# Patient Record
Sex: MALE | Race: BLACK OR AFRICAN AMERICAN | Employment: PART TIME | URBAN - METROPOLITAN AREA
[De-identification: names, ages, dates, MRNs, and addresses within clinical notes are randomized per-mention and may not be internally consistent; named-entity substitution may affect disease eponyms.]

---

## 2017-02-23 ENCOUNTER — HOSPITAL ENCOUNTER (OUTPATIENT)
Dept: LAB | Age: 57
Discharge: HOME | End: 2017-02-23

## 2017-02-23 PROCEDURE — 99001 SPECIMEN HANDLING PT-LAB: CPT

## 2017-03-30 ENCOUNTER — OFFICE VISIT (OUTPATIENT)
Dept: UROLOGY | Age: 57
End: 2017-03-30

## 2017-03-30 VITALS
WEIGHT: 247 LBS | OXYGEN SATURATION: 95 % | HEIGHT: 65 IN | SYSTOLIC BLOOD PRESSURE: 113 MMHG | HEART RATE: 62 BPM | DIASTOLIC BLOOD PRESSURE: 70 MMHG | BODY MASS INDEX: 41.15 KG/M2

## 2017-03-30 DIAGNOSIS — R97.20 PSA ELEVATION: Primary | ICD-10-CM

## 2017-03-30 LAB
BILIRUB UR QL STRIP: NEGATIVE
GLUCOSE UR-MCNC: NEGATIVE MG/DL
KETONES P FAST UR STRIP-MCNC: NEGATIVE MG/DL
PH UR STRIP: 5.5 [PH] (ref 4.6–8)
PROT UR QL STRIP: NEGATIVE MG/DL
SP GR UR STRIP: 1.02 (ref 1–1.03)
UA UROBILINOGEN AMB POC: NORMAL (ref 0.2–1)
URINALYSIS CLARITY POC: CLEAR
URINALYSIS COLOR POC: YELLOW
URINE BLOOD POC: NEGATIVE
URINE LEUKOCYTES POC: NEGATIVE
URINE NITRITES POC: NEGATIVE

## 2017-03-30 RX ORDER — ZOLPIDEM TARTRATE 5 MG/1
TABLET ORAL
COMMUNITY

## 2017-03-30 RX ORDER — SIMVASTATIN 10 MG/1
TABLET, FILM COATED ORAL
COMMUNITY

## 2017-03-30 RX ORDER — METFORMIN HYDROCHLORIDE 500 MG/1
TABLET ORAL 2 TIMES DAILY WITH MEALS
COMMUNITY

## 2017-03-30 RX ORDER — LISINOPRIL 5 MG/1
TABLET ORAL DAILY
COMMUNITY

## 2017-03-30 RX ORDER — ALLOPURINOL 100 MG/1
TABLET ORAL DAILY
COMMUNITY

## 2017-03-30 RX ORDER — INDOMETHACIN 50 MG/1
CAPSULE ORAL 3 TIMES DAILY
COMMUNITY

## 2017-03-30 NOTE — PROGRESS NOTES
Mr. Alpesh Aldana has a reminder for a \"due or due soon\" health maintenance. I have asked that he contact his primary care provider for follow-up on this health maintenance.

## 2017-03-30 NOTE — MR AVS SNAPSHOT
Visit Information Date & Time Provider Department Dept. Phone Encounter #  
 3/30/2017  9:30 AM Mariana Matute, [address] [place] [address] [facility] [phone] [id number] Follow-up Instructions Return in about 3 weeks (around 4/20/2017). Follow-up and Disposition History Upcoming Health Maintenance Date Due Hepatitis C Screening 1960 DTaP/Tdap/Td series (1 - Tdap) 11/10/1980 FOBT Q 1 YEAR AGE 50-75 11/10/2009 INFLUENZA AGE 9 TO ADULT 8/1/2016 Allergies as of 3/30/2017  Review Complete On: 3/30/2017 By: Mariana Matute MD  
 No Known Allergies Current Immunizations  Never Reviewed No immunizations on file. Not reviewed this visit You Were Diagnosed With   
  
 Codes Comments PSA elevation    -  Primary ICD-10-CM: R97.20 ICD-9-CM: 790.93 Vitals BP Pulse Height(growth percentile) Weight(growth percentile) SpO2 BMI  
 113/70 (BP 1 Location: Left arm, BP Patient Position: Sitting) 62 5' 5\" (1.651 m) 247 lb (112 kg) 95% 41.1 kg/m2 Smoking Status Never Smoker BMI and BSA Data Body Mass Index Body Surface Area  
 41.1 kg/m 2 2.27 m 2 Preferred Pharmacy Pharmacy Name Phone [id number] [phone] [address] [phone] Your Updated Medication List  
  
   
This list is accurate as of: 3/30/17 10:07 AM.  Always use your most recent med list.  
  
  
  
  
 allopurinol 100 mg tablet Commonly known as:  Ollen Epley Take  by mouth daily. indomethacin 50 mg capsule Commonly known as:  INDOCIN Take  by mouth three (3) times daily. lisinopril 5 mg tablet Commonly known as:  Asad Shutters Take  by mouth daily. metFORMIN 500 mg tablet Commonly known as:  GLUCOPHAGE Take  by mouth two (2) times daily (with meals). simvastatin 10 mg tablet Commonly known as:  ZOCOR Take  by mouth nightly. zolpidem 5 mg tablet Commonly known as:  AMBIEN Take  by mouth nightly as needed for Sleep. We Performed the Following AMB POC URINALYSIS DIP STICK AUTO W/O MICRO [33269 CPT(R)] WA COLLECTION VENOUS BLOOD,VENIPUNCTURE [id number] CPT(R)] PSA, TOTAL &  FREE [86186 CPT(R)] Follow-up Instructions Return in about 3 weeks (around 4/20/2017). Patient Instructions Prostate-Specific Antigen (PSA) Test: About This Test 
What is it? A prostate-specific antigen (PSA) test measures the amount of PSA in your blood. PSA is released by a man's prostate gland into his blood. A high PSA level may mean that you have an enlargement, infection, or cancer of the prostate. Why is this test done? You may have this test to: · Check for prostate cancer. · Watch prostate cancer and see if treatment is working. How can you prepare for the test? 
Do not ejaculate during the 2 days before your PSA blood test, either during sex or masturbation. What happens before the test? 
Tell your doctor if you have had a: 
· Test to look at your bladder (cystoscopy) in the past several weeks. · Prostate biopsy in the past several weeks. · Prostate infection or urinary tract infection that has not gone away. · Tube (catheter) inserted into your bladder to drain urine recently. What happens during the test? 
A health professional takes a sample of your blood. What happens after the test? 
You can go back to your usual activities right away. When should you call for help? Watch closely for changes in your health, and be sure to contact your doctor if you have any questions about this test. 
Follow-up care is a key part of your treatment and safety. Be sure to make and go to all appointments, and call your doctor if you are having problems. It's also a good idea to keep a list of the medicines you take. Ask your doctor when you can expect to have your test results. Where can you learn more? Go to http://jim-aryan.info/. Enter Q512 in the search box to learn more about \"Prostate-Specific Antigen (PSA) Test: About This Test.\" Current as of: July 26, 2016 Content Version: 11.2 © 6608-4566 Visitar. Care instructions adapted under license by Zumigo (which disclaims liability or warranty for this information). If you have questions about a medical condition or this instruction, always ask your healthcare professional. [address] any warranty or liability for your use of this information. Patient Instructions History Introducing [facility] [place] [facility]! Dear Zohreh Harris: Thank you for requesting a Field Agent account. Our records indicate that you have previously registered for a Field Agent account but its currently inactive. Please call our Field Agent support line at [phone]. Additional Information If you have questions, please visit the Frequently Asked Questions section of the Field Agent website at [email or web address] Remember, Field Agent is NOT to be used for urgent needs. For medical emergencies, dial 911. Now available from your iPhone and Android! Please provide this summary of care documentation to your next provider. Your primary care clinician is listed as Noah Grey. If you have any questions after today's visit, please call [phone].

## 2017-03-30 NOTE — PATIENT INSTRUCTIONS
Prostate-Specific Antigen (PSA) Test: About This Test  What is it? A prostate-specific antigen (PSA) test measures the amount of PSA in your blood. PSA is released by a man's prostate gland into his blood. A high PSA level may mean that you have an enlargement, infection, or cancer of the prostate. Why is this test done? You may have this test to:  · Check for prostate cancer. · Watch prostate cancer and see if treatment is working. How can you prepare for the test?  Do not ejaculate during the 2 days before your PSA blood test, either during sex or masturbation. What happens before the test?  Tell your doctor if you have had a:  · Test to look at your bladder (cystoscopy) in the past several weeks. · Prostate biopsy in the past several weeks. · Prostate infection or urinary tract infection that has not gone away. · Tube (catheter) inserted into your bladder to drain urine recently. What happens during the test?  A health professional takes a sample of your blood. What happens after the test?  You can go back to your usual activities right away. When should you call for help? Watch closely for changes in your health, and be sure to contact your doctor if you have any questions about this test.  Follow-up care is a key part of your treatment and safety. Be sure to make and go to all appointments, and call your doctor if you are having problems. It's also a good idea to keep a list of the medicines you take. Ask your doctor when you can expect to have your test results. Where can you learn more? Go to http://jim-aryan.info/. Enter L095 in the search box to learn more about \"Prostate-Specific Antigen (PSA) Test: About This Test.\"  Current as of: July 26, 2016  Content Version: 11.2  © 0482-5786 SocialShield. Care instructions adapted under license by Alt12 Apps (which disclaims liability or warranty for this information).  If you have questions about a medical condition or this instruction, always ask your healthcare professional. [address] [address] any warranty or liability for your use of this information.

## 2017-03-30 NOTE — PROGRESS NOTES
Chief Complaint   Patient presents with    New Patient    Elevated PSA    Polyuria       HISTORY OF PRESENT ILLNESS:  Mauricio Velez is a 61 y.o. male who presents today for evaluation of a PSA of 3.3. Traditionally is felt that a 69-year-old man ought to have a PSA of less than 2.5 and hence he is sent here for evaluation of this elevation. He denies any history of significant urinary outlet obstructive symptoms. He does have some daytime frequency but he only has nocturia ×1. No urinary infections, no gross hematuria. He denies knowledge of any family members with prostate cancer. And as well as I can tell from the chart, he has not had a prior PSA. ROS this is all documented on the chart refer to that for details    Past Medical History:   Diagnosis Date    Diabetes [facility])        History reviewed. No pertinent surgical history. Social History   Substance Use Topics    Smoking status: Never Smoker    Smokeless tobacco: None    Alcohol use Yes       No Known Allergies    Family History   Problem Relation Age of Onset    Diabetes Sister        Current Outpatient Prescriptions   Medication Sig Dispense Refill    metFORMIN (GLUCOPHAGE) 500 mg tablet Take  by mouth two (2) times daily (with meals).  simvastatin (ZOCOR) 10 mg tablet Take  by mouth nightly.  allopurinol (ZYLOPRIM) 100 mg tablet Take  by mouth daily.  lisinopril (PRINIVIL, ZESTRIL) 5 mg tablet Take  by mouth daily.  zolpidem (AMBIEN) 5 mg tablet Take  by mouth nightly as needed for Sleep.  indomethacin (INDOCIN) 50 mg capsule Take  by mouth three (3) times daily. PHYSICAL EXAMINATION:   Visit Vitals    /70 (BP 1 Location: Left arm, BP Patient Position: Sitting)    Pulse 62    Ht 5' 5\" (1.651 m)    Wt 247 lb (112 kg)    SpO2 95%    BMI 41.1 kg/m2     Constitutional: WDWN, Pleasant and appropriate affect, No acute distress.     CV:  No peripheral swelling noted  Respiratory: No respiratory distress or difficulties  Abdomen:  No abdominal masses or tenderness. No CVA tenderness. No inguinal hernias noted.  Male:    ALESIA:Perineum normal to visual inspection, no erythema or irritation, normal sphincter tone, the prostate is about 20-30 g in size and I do not feel any nodularity or induration generally feels benign in nature. SCROTUM:  No scrotal rash or lesions noticed. Normal bilateral testes and epididymis. PENIS: Urethral meatus normal in location and size. No urethral discharge. Skin: No jaundice. Neuro/Psych:  Alert and oriented x 3, affect appropriate. Lymphatic:   No enlarged inguinal lymph nodes. Results for orders placed or performed in visit on 03/30/17   AMB POC URINALYSIS DIP STICK AUTO W/O MICRO   Result Value Ref Range    Color (UA POC) Yellow     Clarity (UA POC) Clear     Glucose (UA POC) Negative Negative    Bilirubin (UA POC) Negative Negative    Ketones (UA POC) Negative Negative    Specific gravity (UA POC) 1.020 1.001 - 1.035    Blood (UA POC) Negative Negative    pH (UA POC) 5.5 4.6 - 8.0    Protein (UA POC) Negative Negative mg/dL    Urobilinogen (UA POC) 1 mg/dL 0.2 - 1    Nitrites (UA POC) Negative Negative    Leukocyte esterase (UA POC) Negative Negative         REVIEW OF LABS AND IMAGING: There are no images available for review. Imaging Report Reviewed? NO     Images Reviewed? NO           Other Lab Data Reviewed? YES         ASSESSMENT:     ICD-10-CM ICD-9-CM    1. PSA elevation R97.20 790.93 AMB POC URINALYSIS DIP STICK AUTO W/O MICRO      PSA, TOTAL &  FREE      ND COLLECTION VENOUS BLOOD,VENIPUNCTURE            PLAN / DISCUSSION: : I have gone over with him and his friend the relationship of PSAs and prostate cancer.   In view of the fact that this is elevated for a mid 60-year-old man, I am going to go ahead and repeat the PSA getting free and total percentages and then I will see him back in about 3 weeks and will decide whether to proceed with a biopsy at that time or not. The patient expresses understanding and agreement of the discussion and plan. Marli Viera MD on 3/30/2017         Please note: This document has been produced using voice recognition software. Unrecognized errors in transcription may be present.

## 2017-03-31 LAB
PSA FREE MFR SERPL: 11.8 %
PSA FREE SERPL-MCNC: 0.26 NG/ML
PSA SERPL-MCNC: 2.2 NG/ML (ref 0–4)

## 2017-03-31 NOTE — PROGRESS NOTES
His PSA on repeat had dropped back down to 2.2 but his percent free PSA is 11.8% and puts him in the 24% likelihood of having prostate cancer. He is scheduled to see me again on April 20 and I will repeat another PSA and free PSA. If this still shows a likelihood of prostate cancer, he will need a biopsy.

## 2017-04-20 ENCOUNTER — OFFICE VISIT (OUTPATIENT)
Dept: UROLOGY | Age: 57
End: 2017-04-20

## 2017-04-20 VITALS
HEIGHT: 65 IN | HEART RATE: 63 BPM | DIASTOLIC BLOOD PRESSURE: 64 MMHG | BODY MASS INDEX: 40.82 KG/M2 | OXYGEN SATURATION: 97 % | WEIGHT: 245 LBS | SYSTOLIC BLOOD PRESSURE: 106 MMHG

## 2017-04-20 DIAGNOSIS — R97.20 ELEVATED PSA: Primary | ICD-10-CM

## 2017-04-20 LAB
BILIRUB UR QL STRIP: NEGATIVE
GLUCOSE UR-MCNC: NEGATIVE MG/DL
KETONES P FAST UR STRIP-MCNC: NEGATIVE MG/DL
PH UR STRIP: 6 [PH] (ref 4.6–8)
PROT UR QL STRIP: NEGATIVE MG/DL
SP GR UR STRIP: 1.02 (ref 1–1.03)
UA UROBILINOGEN AMB POC: NORMAL (ref 0.2–1)
URINALYSIS CLARITY POC: CLEAR
URINALYSIS COLOR POC: YELLOW
URINE BLOOD POC: NEGATIVE
URINE LEUKOCYTES POC: NEGATIVE
URINE NITRITES POC: NEGATIVE

## 2017-04-20 NOTE — PATIENT INSTRUCTIONS
Prostate-Specific Antigen (PSA) Test: About This Test  What is it? A prostate-specific antigen (PSA) test measures the amount of PSA in your blood. PSA is released by a man's prostate gland into his blood. A high PSA level may mean that you have an enlargement, infection, or cancer of the prostate. Why is this test done? You may have this test to:  · Check for prostate cancer. · Watch prostate cancer and see if treatment is working. How can you prepare for the test?  Do not ejaculate during the 2 days before your PSA blood test, either during sex or masturbation. What happens before the test?  Tell your doctor if you have had a:  · Test to look at your bladder (cystoscopy) in the past several weeks. · Prostate biopsy in the past several weeks. · Prostate infection or urinary tract infection that has not gone away. · Tube (catheter) inserted into your bladder to drain urine recently. What happens during the test?  A health professional takes a sample of your blood. What happens after the test?  You can go back to your usual activities right away. When should you call for help? Watch closely for changes in your health, and be sure to contact your doctor if you have any questions about this test.  Follow-up care is a key part of your treatment and safety. Be sure to make and go to all appointments, and call your doctor if you are having problems. It's also a good idea to keep a list of the medicines you take. Ask your doctor when you can expect to have your test results. Where can you learn more? Go to http://jim-aryan.info/. Enter C327 in the search box to learn more about \"Prostate-Specific Antigen (PSA) Test: About This Test.\"  Current as of: July 26, 2016  Content Version: 11.2  © 9592-1224 MailMag. Care instructions adapted under license by Lever (which disclaims liability or warranty for this information).  If you have questions about a medical condition or this instruction, always ask your healthcare professional. [address] [address] any warranty or liability for your use of this information.

## 2017-04-20 NOTE — PROGRESS NOTES
Chief Complaint   Patient presents with    Elevated PSA       HISTORY OF PRESENT ILLNESS:  Twin Lange is a 61 y.o. male who presents today in follow-up to fluctuations of his PSA. In summary, Dante Galaviz was found to have an elevated PSA for a 48-year-old -American man at 3.3 earlier this year. A repeat PSA in our office resulted in a value of 2.2 but I got a total and free component at that time demonstrating an elevated likelihood of prostate cancer. Unfortunately the values of free and total PSA currently are primarily geared toward PSA determinations of above 4. At any rate his examination was normal his prostate feels benign and non-suggestive of cancer and there is no family history of prostate cancer. His PSA is repeated today and if this study from today is still below 3, I probably will simply repeated in 6 months. ROS unchanged since last office visit. Past Medical History:   Diagnosis Date    Diabetes [facility])        History reviewed. No pertinent surgical history. Social History   Substance Use Topics    Smoking status: Never Smoker    Smokeless tobacco: None    Alcohol use Yes       No Known Allergies    Family History   Problem Relation Age of Onset    Diabetes Sister        Current Outpatient Prescriptions   Medication Sig Dispense Refill    metFORMIN (GLUCOPHAGE) 500 mg tablet Take  by mouth two (2) times daily (with meals).  simvastatin (ZOCOR) 10 mg tablet Take  by mouth nightly.  allopurinol (ZYLOPRIM) 100 mg tablet Take  by mouth daily.  lisinopril (PRINIVIL, ZESTRIL) 5 mg tablet Take  by mouth daily.  zolpidem (AMBIEN) 5 mg tablet Take  by mouth nightly as needed for Sleep.  indomethacin (INDOCIN) 50 mg capsule Take  by mouth three (3) times daily.            PHYSICAL EXAMINATION:   Visit Vitals    /64 (BP 1 Location: Left arm, BP Patient Position: Sitting)    Pulse 63    Ht 5' 5\" (1.651 m)    Wt 245 lb (111.1 kg)    SpO2 97%  BMI 40.77 kg/m2     Constitutional: WDWN, Pleasant and appropriate affect, No acute distress. CV:  No peripheral swelling noted  Respiratory: No respiratory distress or difficulties  Abdomen:  No abdominal masses or tenderness. No CVA tenderness. No inguinal hernias noted.  Male:   deferred today. His prior prostate exam revealed a 40 g gland that was smooth and symmetrical with no nodularity. Skin: No jaundice. Neuro/Psych:  Alert and oriented x 3, affect appropriate. Lymphatic:   No enlarged inguinal lymph nodes. Results for orders placed or performed in visit on 04/20/17   AMB POC URINALYSIS DIP STICK AUTO W/O MICRO   Result Value Ref Range    Color (UA POC) Yellow     Clarity (UA POC) Clear     Glucose (UA POC) Negative Negative    Bilirubin (UA POC) Negative Negative    Ketones (UA POC) Negative Negative    Specific gravity (UA POC) 1.020 1.001 - 1.035    Blood (UA POC) Negative Negative    pH (UA POC) 6.0 4.6 - 8.0    Protein (UA POC) Negative Negative mg/dL    Urobilinogen (UA POC) 1 mg/dL 0.2 - 1    Nitrites (UA POC) Negative Negative    Leukocyte esterase (UA POC) Negative Negative         REVIEW OF LABS AND IMAGING:     Imaging Report Reviewed? NO     Images Reviewed? NO           Other Lab Data Reviewed? YES         ASSESSMENT:     ICD-10-CM ICD-9-CM    1. Elevated PSA R97.20 790.93 AMB POC URINALYSIS DIP STICK AUTO W/O MICRO      CT COLLECTION VENOUS BLOOD,VENIPUNCTURE      PSA, TOTAL &  FREE      CANCELED: PROSTATE SPECIFIC AG (PSA)            PLAN / DISCUSSION: : He has a borderline elevated PSA for a 34-year-old man but at this point his prostate feels normal and I am a little hesitant to proceed with a prostate biopsy. I am going to repeat that study today and will call him back but if  the PSA is still 2, I will probably just recheck him in 6 months. The patient expresses understanding and agreement of the discussion and plan.     Félix Rucker MD on 4/20/2017         Please note: This document has been produced using voice recognition software. Unrecognized errors in transcription may be present.

## 2017-04-20 NOTE — MR AVS SNAPSHOT
Visit Information Date & Time Provider Department Dept. Phone Encounter #  
 4/20/2017 10:00 AM Ryann Kim, [address] [place] [address] [facility] [phone] [id number] Upcoming Health Maintenance Date Due Hepatitis C Screening 1960 DTaP/Tdap/Td series (1 - Tdap) 11/10/1980 FOBT Q 1 YEAR AGE 50-75 11/10/2009 INFLUENZA AGE 9 TO ADULT 8/1/2016 Allergies as of 4/20/2017  Review Complete On: 4/20/2017 By: Negro Rodriguez LPN No Known Allergies Current Immunizations  Never Reviewed No immunizations on file. Not reviewed this visit You Were Diagnosed With   
  
 Codes Comments Elevated PSA    -  Primary ICD-10-CM: R97.20 ICD-9-CM: 790.93 Vitals BP Pulse Height(growth percentile) Weight(growth percentile) SpO2 BMI  
 106/64 (BP 1 Location: Left arm, BP Patient Position: Sitting) 63 5' 5\" (1.651 m) 245 lb (111.1 kg) 97% 40.77 kg/m2 Smoking Status Never Smoker Vitals History BMI and BSA Data Body Mass Index Body Surface Area 40.77 kg/m 2 2.26 m 2 Preferred Pharmacy Pharmacy Name Phone [id number] [phone] Your Updated Medication List  
  
   
This list is accurate as of: 4/20/17 10:35 AM.  Always use your most recent med list.  
  
  
  
  
 allopurinol 100 mg tablet Commonly known as:  Jarome Salvador Take  by mouth daily. indomethacin 50 mg capsule Commonly known as:  INDOCIN Take  by mouth three (3) times daily. lisinopril 5 mg tablet Commonly known as:  Abigail Mike Take  by mouth daily. metFORMIN 500 mg tablet Commonly known as:  GLUCOPHAGE Take  by mouth two (2) times daily (with meals). simvastatin 10 mg tablet Commonly known as:  ZOCOR Take  by mouth nightly. zolpidem 5 mg tablet Commonly known as:  AMBIEN Take  by mouth nightly as needed for Sleep. We Performed the Following AMB POC URINALYSIS DIP STICK AUTO W/O MICRO [56543 CPT(R)] [place] COLLECTION VENOUS BLOOD,VENIPUNCTURE [id number] CPT(R)] PSA, TOTAL &  FREE [10829 CPT(R)] Patient Instructions Prostate-Specific Antigen (PSA) Test: About This Test 
What is it? A prostate-specific antigen (PSA) test measures the amount of PSA in your blood. PSA is released by a man's prostate gland into his blood. A high PSA level may mean that you have an enlargement, infection, or cancer of the prostate. Why is this test done? You may have this test to: · Check for prostate cancer. · Watch prostate cancer and see if treatment is working. How can you prepare for the test? 
Do not ejaculate during the 2 days before your PSA blood test, either during sex or masturbation. What happens before the test? 
Tell your doctor if you have had a: 
· Test to look at your bladder (cystoscopy) in the past several weeks. · Prostate biopsy in the past several weeks. · Prostate infection or urinary tract infection that has not gone away. · Tube (catheter) inserted into your bladder to drain urine recently. What happens during the test? 
A health professional takes a sample of your blood. What happens after the test? 
You can go back to your usual activities right away. When should you call for help? Watch closely for changes in your health, and be sure to contact your doctor if you have any questions about this test. 
Follow-up care is a key part of your treatment and safety. Be sure to make and go to all appointments, and call your doctor if you are having problems. It's also a good idea to keep a list of the medicines you take. Ask your doctor when you can expect to have your test results. Where can you learn more? Go to http://jim-aryan.info/.  
Enter V481 in the search box to learn more about \"Prostate-Specific Antigen (PSA) Test: About This Test.\" 
 Current as of: July 26, 2016 Content Version: 11.2 © 4806-8808 Mobypark, Nextreme Thermal Solutions. Care instructions adapted under license by [employer]M [employer] (which disclaims liability or warranty for this information). If you have questions about a medical condition or this instruction, always ask your healthcare professional. [address] any warranty or liability for your use of this information. Introducing [facility] [place] [facility]! Dear Dominguez Garvin: Thank you for requesting a Innovectra account. Our records indicate that you have previously registered for a Innovectra account but its currently inactive. Please call our Innovectra support line at [phone]. Additional Information If you have questions, please visit the Frequently Asked Questions section of the Innovectra website at [email or web address] Remember, Innovectra is NOT to be used for urgent needs. For medical emergencies, dial 911. Now available from your iPhone and Android! Please provide this summary of care documentation to your next provider. Your primary care clinician is listed as Sharonda Gomez. If you have any questions after today's visit, please call [phone].

## 2017-04-20 NOTE — PROGRESS NOTES
RBV. Per Dr. Justino Coello lab drawn in office today for PSA free and total for elevated PSA. Mr. Bárbara Fernandes has a reminder for a \"due or due soon\" health maintenance. I have asked that he contact his primary care provider for follow-up on this health maintenance.

## 2017-04-21 LAB
PSA FREE MFR SERPL: 11.3 %
PSA FREE SERPL-MCNC: 0.27 NG/ML
PSA SERPL-MCNC: 2.4 NG/ML (ref 0–4)

## 2017-04-21 NOTE — PROGRESS NOTES
His PSA is still low at 2.4. At this time I would like to leave him alone and just have him return in 6 months for follow-up PSA.

## 2019-09-07 ENCOUNTER — APPOINTMENT (OUTPATIENT)
Dept: CT IMAGING | Age: 59
End: 2019-09-07
Attending: EMERGENCY MEDICINE
Payer: MEDICARE

## 2019-09-07 ENCOUNTER — HOSPITAL ENCOUNTER (EMERGENCY)
Age: 59
Discharge: HOME | End: 2019-09-07
Attending: EMERGENCY MEDICINE
Payer: MEDICARE

## 2019-09-07 VITALS
SYSTOLIC BLOOD PRESSURE: 110 MMHG | BODY MASS INDEX: 40.32 KG/M2 | WEIGHT: 242 LBS | OXYGEN SATURATION: 100 % | RESPIRATION RATE: 16 BRPM | TEMPERATURE: 98.6 F | DIASTOLIC BLOOD PRESSURE: 66 MMHG | HEIGHT: 65 IN

## 2019-09-07 DIAGNOSIS — R51.9 ACUTE NONINTRACTABLE HEADACHE, UNSPECIFIED HEADACHE TYPE: Primary | ICD-10-CM

## 2019-09-07 LAB
ANION GAP SERPL CALC-SCNC: 8 MMOL/L (ref 3–18)
BASOPHILS # BLD: 0 K/UL (ref 0–0.1)
BASOPHILS NFR BLD: 0 % (ref 0–2)
BUN SERPL-MCNC: 16 MG/DL (ref 7–18)
BUN/CREAT SERPL: 17 (ref 12–20)
CALCIUM SERPL-MCNC: 8.9 MG/DL (ref 8.5–10.1)
CHLORIDE SERPL-SCNC: 106 MMOL/L (ref 100–111)
CO2 SERPL-SCNC: 29 MMOL/L (ref 21–32)
CREAT SERPL-MCNC: 0.95 MG/DL (ref 0.6–1.3)
DIFFERENTIAL METHOD BLD: ABNORMAL
EOSINOPHIL # BLD: 0 K/UL (ref 0–0.4)
EOSINOPHIL NFR BLD: 1 % (ref 0–5)
ERYTHROCYTE [DISTWIDTH] IN BLOOD BY AUTOMATED COUNT: 15 % (ref 11.6–14.5)
GLUCOSE SERPL-MCNC: 104 MG/DL (ref 74–99)
HCT VFR BLD AUTO: 37.8 % (ref 36–48)
HGB BLD-MCNC: 12.6 G/DL (ref 13–16)
LYMPHOCYTES # BLD: 2.2 K/UL (ref 0.9–3.6)
LYMPHOCYTES NFR BLD: 31 % (ref 21–52)
MCH RBC QN AUTO: 27.9 PG (ref 24–34)
MCHC RBC AUTO-ENTMCNC: 33.3 G/DL (ref 31–37)
MCV RBC AUTO: 83.6 FL (ref 74–97)
MONOCYTES # BLD: 0.5 K/UL (ref 0.05–1.2)
MONOCYTES NFR BLD: 7 % (ref 3–10)
NEUTS SEG # BLD: 4.4 K/UL (ref 1.8–8)
NEUTS SEG NFR BLD: 61 % (ref 40–73)
PLATELET # BLD AUTO: 191 K/UL (ref 135–420)
PMV BLD AUTO: 10.7 FL (ref 9.2–11.8)
POTASSIUM SERPL-SCNC: 4.1 MMOL/L (ref 3.5–5.5)
RBC # BLD AUTO: 4.52 M/UL (ref 4.7–5.5)
SODIUM SERPL-SCNC: 143 MMOL/L (ref 136–145)
WBC # BLD AUTO: 7.1 K/UL (ref 4.6–13.2)

## 2019-09-07 PROCEDURE — 70450 CT HEAD/BRAIN W/O DYE: CPT

## 2019-09-07 PROCEDURE — 85025 COMPLETE CBC W/AUTO DIFF WBC: CPT

## 2019-09-07 PROCEDURE — 74011250636 HC RX REV CODE- 250/636: Performed by: EMERGENCY MEDICINE

## 2019-09-07 PROCEDURE — 80048 BASIC METABOLIC PNL TOTAL CA: CPT

## 2019-09-07 PROCEDURE — 96374 THER/PROPH/DIAG INJ IV PUSH: CPT

## 2019-09-07 PROCEDURE — 99284 EMERGENCY DEPT VISIT MOD MDM: CPT

## 2019-09-07 RX ORDER — BUTALBITAL, ACETAMINOPHEN AND CAFFEINE 300; 40; 50 MG/1; MG/1; MG/1
1 CAPSULE ORAL
Qty: 10 CAP | Refills: 0 | Status: SHIPPED | OUTPATIENT
Start: 2019-09-07

## 2019-09-07 RX ORDER — METOCLOPRAMIDE HYDROCHLORIDE 5 MG/ML
10 INJECTION INTRAMUSCULAR; INTRAVENOUS
Status: COMPLETED | OUTPATIENT
Start: 2019-09-07 | End: 2019-09-07

## 2019-09-07 RX ADMIN — METOCLOPRAMIDE 10 MG: 5 INJECTION, SOLUTION INTRAMUSCULAR; INTRAVENOUS at 11:45

## 2019-09-07 NOTE — DISCHARGE INSTRUCTIONS

## 2019-09-07 NOTE — ED PROVIDER NOTES
EMERGENCY DEPARTMENT HISTORY AND PHYSICAL EXAM    11:08 AM      Date: 9/7/2019  Patient Name: Jaun Rose    History of Presenting Illness     Chief Complaint   Patient presents with    Headache         History Provided By: Patient    Chief Complaint: headache  Duration:  Days  Timing:  Acute  Location: L sided  Quality: Pressure  Severity: Severe  Modifying Factors: none  Associated Symptoms: denies any other associated signs or symptoms      Additional History (Context): Jaun Rose is a 60 y.o. male with diabetes, hypertension and gout who presents with headache. Patient states started a few days ago. Unsure what he was doing at the time but denies any injury or fall. States it has been constant pain. No alleviating or aggravating factors. Has slightly worsened over the past few days. Not improved with Goody's yesterday. No numbness or tingling. No fever. Does report a prior episode of similar headache about 10 years ago. Denies any vision changes. No other complaints. PCP: Alex Rubio MD    Current Outpatient Medications   Medication Sig Dispense Refill    butalbital-acetaminophen-caff (FIORICET) -40 mg per capsule Take 1 Cap by mouth every four (4) hours as needed for Pain. 10 Cap 0    metFORMIN (GLUCOPHAGE) 500 mg tablet Take  by mouth two (2) times daily (with meals).  simvastatin (ZOCOR) 10 mg tablet Take  by mouth nightly.  allopurinol (ZYLOPRIM) 100 mg tablet Take  by mouth daily.  lisinopril (PRINIVIL, ZESTRIL) 5 mg tablet Take  by mouth daily.  zolpidem (AMBIEN) 5 mg tablet Take  by mouth nightly as needed for Sleep.  indomethacin (INDOCIN) 50 mg capsule Take  by mouth three (3) times daily. Past History     Past Medical History:  Past Medical History:   Diagnosis Date    Diabetes [facility])        Past Surgical History:  No past surgical history on file.     Family History:  Family History   Problem Relation Age of Onset    Diabetes Sister        Social History:  Social History     Tobacco Use    Smoking status: Never Smoker   Substance Use Topics    Alcohol use: Yes    Drug use: Not on file       Allergies:  No Known Allergies      Review of Systems     Review of Systems   Constitutional: Negative for fever. Eyes: Negative for photophobia, pain and visual disturbance. Respiratory: Negative for shortness of breath. Cardiovascular: Negative for chest pain. Musculoskeletal: Negative for neck pain. Neurological: Positive for headaches. Negative for dizziness, seizures and numbness. All other systems reviewed and are negative. Physical Exam     Visit Vitals  /66 (BP 1 Location: Right arm, BP Patient Position: At rest)   Temp 98.6 °F (37 °C)   Resp 16   Ht 5' 5\" (1.651 m)   Wt 109.8 kg (242 lb)   SpO2 100%   BMI 40.27 kg/m²       Physical Exam   Constitutional: He is oriented to person, place, and time. He appears well-developed and well-nourished. HENT:   Head: Normocephalic and atraumatic. Eyes: EOM are normal.   Neck: Normal range of motion. Neck supple. No JVD present. Cardiovascular: Normal rate and regular rhythm. Pulmonary/Chest: Effort normal and breath sounds normal. No respiratory distress. Abdominal: Soft. He exhibits no distension. There is no tenderness. There is no rebound and no guarding. Musculoskeletal: He exhibits no edema. No joint tenderness   Neurological: He is alert and oriented to person, place, and time. No cranial nerve deficit. Finger-to-nose intact bilaterally, heel-to-shin intact bilaterally, strength out of 5×4, gross sensation intact x4   Skin: Skin is warm and dry. No erythema.    Psychiatric: Judgment normal.         Diagnostic Study Results     Labs -  Recent Results (from the past 12 hour(s))   CBC WITH AUTOMATED DIFF    Collection Time: 09/07/19 10:46 AM   Result Value Ref Range    WBC 7.1 4.6 - 13.2 K/uL    RBC 4.52 (L) 4.70 - 5.50 M/uL    HGB 12.6 (L) 13.0 - 16.0 g/dL HCT 37.8 36.0 - 48.0 %    MCV 83.6 74.0 - 97.0 FL    MCH 27.9 24.0 - 34.0 PG    MCHC 33.3 31.0 - 37.0 g/dL    RDW 15.0 (H) 11.6 - 14.5 %    PLATELET 473 705 - 857 K/uL    MPV 10.7 9.2 - 11.8 FL    NEUTROPHILS 61 40 - 73 %    LYMPHOCYTES 31 21 - 52 %    MONOCYTES 7 3 - 10 %    EOSINOPHILS 1 0 - 5 %    BASOPHILS 0 0 - 2 %    ABS. NEUTROPHILS 4.4 1.8 - 8.0 K/UL    ABS. LYMPHOCYTES 2.2 0.9 - 3.6 K/UL    ABS. MONOCYTES 0.5 0.05 - 1.2 K/UL    ABS. EOSINOPHILS 0.0 0.0 - 0.4 K/UL    ABS. BASOPHILS 0.0 0.0 - 0.1 K/UL    DF AUTOMATED     METABOLIC PANEL, BASIC    Collection Time: 09/07/19 10:46 AM   Result Value Ref Range    Sodium 143 136 - 145 mmol/L    Potassium 4.1 3.5 - 5.5 mmol/L    Chloride 106 100 - 111 mmol/L    CO2 29 21 - 32 mmol/L    Anion gap 8 3.0 - 18 mmol/L    Glucose 104 (H) 74 - 99 mg/dL    BUN 16 7.0 - 18 MG/DL    Creatinine 0.95 0.6 - 1.3 MG/DL    BUN/Creatinine ratio 17 12 - 20      GFR est AA >60 >60 ml/min/1.73m2    GFR est non-AA >60 >60 ml/min/1.73m2    Calcium 8.9 8.5 - 10.1 MG/DL     Labs unremarkable    Radiologic Studies -   CT HEAD WO CONT   Final Result   IMPRESSION: Negative noncontrast head CT for age. Medical Decision Making   I am the first provider for this patient. I reviewed the vital signs, available nursing notes, past medical history, past surgical history, family history and social history. Vital Signs-Reviewed the patient's vital signs. Pulse Oximetry Analysis -  98 on room air (Interpretation)nl       Records Reviewed: Nursing Notes (Time of Review: 11:08 AM)    ED Course: Progress Notes, Reevaluation, and Consults:      Provider Notes (Medical Decision Making): 50-year-old male presenting with a left-sided headache times a few days. No trauma or injury, does not sound maximal onset, no neck pain. Due to his age will plan for CT head to rule out ICH, although no focal deficits to suspect a CVA at this time.   Patient is afebrile, no meningeal signs to suspect meningitis. He is well-appearing. 1:30 PM  Discussed results with patient, he is feeling better at this time. Labs and imaging reassuring. Have advised PCP follow-up, may require neurology if persistent headaches. Discussed return precautions if any new or worsening symptoms. Stable for DC home. Diagnosis     Clinical Impression:   1. Acute nonintractable headache, unspecified headache type        Disposition: discharged    Follow-up Information     Follow up With Specialties Details Why Contact Info    Sheba Bryan MD Internal Medicine Schedule an appointment as soon as possible for a visit in 1 week  [address] [place] [address] [address] [place] [address]      [facility] EMERGENCY DEPT Emergency Medicine  As needed, If symptoms worsen [address] [facility] [address]  [phone]           Patient's Medications   Start Taking    BUTALBITAL-ACETAMINOPHEN-CAFF (FIORICET) -40 MG PER CAPSULE    Take 1 Cap by mouth every four (4) hours as needed for Pain. Continue Taking    ALLOPURINOL (ZYLOPRIM) 100 MG TABLET    Take  by mouth daily. INDOMETHACIN (INDOCIN) 50 MG CAPSULE    Take  by mouth three (3) times daily. LISINOPRIL (PRINIVIL, ZESTRIL) 5 MG TABLET    Take  by mouth daily. METFORMIN (GLUCOPHAGE) 500 MG TABLET    Take  by mouth two (2) times daily (with meals). SIMVASTATIN (ZOCOR) 10 MG TABLET    Take  by mouth nightly. ZOLPIDEM (AMBIEN) 5 MG TABLET    Take  by mouth nightly as needed for Sleep.    These Medications have changed    No medications on file   Stop Taking    No medications on file     _______________________________

## 2019-09-07 NOTE — ED TRIAGE NOTES
\" I have a headache that comes and goes on the left of my head.  When it came back it was hard, this been going on for the last three days its worse at night\"

## 2020-09-24 ENCOUNTER — HOSPITAL ENCOUNTER (EMERGENCY)
Age: 60
Discharge: HOME | End: 2020-09-24
Attending: EMERGENCY MEDICINE
Payer: MEDICARE

## 2020-09-24 ENCOUNTER — APPOINTMENT (OUTPATIENT)
Dept: GENERAL RADIOLOGY | Age: 60
End: 2020-09-24
Attending: NURSE PRACTITIONER
Payer: MEDICARE

## 2020-09-24 VITALS
HEART RATE: 68 BPM | OXYGEN SATURATION: 98 % | RESPIRATION RATE: 16 BRPM | DIASTOLIC BLOOD PRESSURE: 75 MMHG | TEMPERATURE: 99.2 F | SYSTOLIC BLOOD PRESSURE: 128 MMHG

## 2020-09-24 DIAGNOSIS — M19.90 ARTHRITIS: Primary | ICD-10-CM

## 2020-09-24 DIAGNOSIS — M25.562 ARTHRALGIA OF LEFT KNEE: ICD-10-CM

## 2020-09-24 PROCEDURE — 99281 EMR DPT VST MAYX REQ PHY/QHP: CPT

## 2020-09-24 PROCEDURE — 73562 X-RAY EXAM OF KNEE 3: CPT

## 2020-09-24 RX ORDER — DICLOFENAC SODIUM 10 MG/G
GEL TOPICAL 4 TIMES DAILY
Qty: 1 EACH | Refills: 0 | Status: SHIPPED | OUTPATIENT
Start: 2020-09-24

## 2020-09-25 NOTE — ED TRIAGE NOTES
Pt reports to ER for left knee pain for one week. No injury reported. Pt is able to ambulate but has a limp.  Pt denies severe pain but would like to have exam.

## 2020-09-25 NOTE — ED PROVIDER NOTES
[facility]  Emergency Department Treatment Report        10:34 PM Carina Pickens is a 60 y.o. male who presents to ED left knee pain. Patient states it has been hurting for about 1 week he has had no injury it hurts when he moves it some no fever has been reported he has not seen his doctor for it yet. No other complaints, associated symptoms or modifying factors at this time. PCP: Deb Mai MD      The history is provided by the patient. No  was used. Knee Pain    This is a new problem. The current episode started more than 1 week ago. The problem occurs constantly. The problem has not changed since onset. The pain is present in the left knee. The quality of the pain is described as aching. The pain is mild. Pertinent negatives include no numbness, full range of motion, no stiffness, no tingling, no back pain and no neck pain. The symptoms are aggravated by activity. Past Medical History:   Diagnosis Date    Diabetes [facility])        History reviewed. No pertinent surgical history. Family History:   Problem Relation Age of Onset    Diabetes Sister        Social History     Socioeconomic History    Marital status: SINGLE     Spouse name: Not on file    Number of children: Not on file    Years of education: Not on file    Highest education level: Not on file   Occupational History    Not on file   Social Needs    Financial resource strain: Not on file    Food insecurity     Worry: Not on file     Inability: Not on file    Transportation needs     Medical: Not on file     Non-medical: Not on file   Tobacco Use    Smoking status: Never Smoker    Smokeless tobacco: Never Used   Substance and Sexual Activity    Alcohol use:  Yes    Drug use: Not on file    Sexual activity: Not on file   Lifestyle    Physical activity     Days per week: Not on file     Minutes per session: Not on file    Stress: Not on file   Relationships    Social connections Talks on phone: Not on file     Gets together: Not on file     Attends Denominational service: Not on file     Active member of club or organization: Not on file     Attends meetings of clubs or organizations: Not on file     Relationship status: Not on file    Intimate partner violence     Fear of current or ex partner: Not on file     Emotionally abused: Not on file     Physically abused: Not on file     Forced sexual activity: Not on file   Other Topics Concern    Not on file   Social History Narrative    Not on file         ALLERGIES: Patient has no known allergies. Review of Systems   Constitutional: Negative for fever. Musculoskeletal: Positive for arthralgias. Negative for back pain, gait problem, joint swelling, myalgias, neck pain, neck stiffness and stiffness. Skin: Negative for color change. Neurological: Negative for dizziness, tingling and numbness. All other systems reviewed and are negative. Vitals:    09/24/20 2144   BP: 128/75   Pulse: 68   Resp: 16   Temp: 99.2 °F (37.3 °C)   SpO2: 98%            Physical Exam  Vitals signs and nursing note reviewed. Constitutional:       Appearance: He is well-developed. HENT:      Head: Normocephalic and atraumatic. Eyes:      Conjunctiva/sclera: Conjunctivae normal.      Pupils: Pupils are equal, round, and reactive to light. Neck:      Musculoskeletal: Normal range of motion and neck supple. Cardiovascular:      Rate and Rhythm: Normal rate and regular rhythm. Pulmonary:      Effort: Pulmonary effort is normal. No respiratory distress. Breath sounds: Normal breath sounds. Abdominal:      General: Bowel sounds are normal.      Palpations: Abdomen is soft. Musculoskeletal: Normal range of motion. General: Tenderness present. No swelling, deformity or signs of injury. Legs:       Comments: Negative right calf pain negative swelling negative erythema to calf   Skin:     General: Skin is warm and dry.    Neurological: Mental Status: He is alert and oriented to person, place, and time. Deep Tendon Reflexes: Reflexes are normal and symmetric. Psychiatric:         Behavior: Behavior normal.         Thought Content: Thought content normal.         Judgment: Judgment normal.          MDM  Number of Diagnoses or Management Options  Arthralgia of left knee:   Arthritis:   Diagnosis management comments: I suspect patient is experiencing some arthralgia or/arthritis. Given his age and nontraumatic pain exam crepitus with range of motion. I will order an x-ray. I do not suspect DVT or any other abnormality no calf pain no calf swelling no erythema to the calf no chest pain or shortness of breath is reported. X-ray shows bone-on-bone significant degenerative arthritis. I suspect that is the reason why patient is having pain. I will refer patient to orthopedics and will write for Voltaren gel for topical application for pain. No other acute illnesses suspected patient discharged home in no distress. Amount and/or Complexity of Data Reviewed  Tests in the radiology section of CPT®: ordered and reviewed  Review and summarize past medical records: yes  Independent visualization of images, tracings, or specimens: yes    Risk of Complications, Morbidity, and/or Mortality  Presenting problems: low  Diagnostic procedures: low  Management options: low    Patient Progress  Patient progress: stable         Procedures            Vitals:  Patient Vitals for the past 12 hrs:   Temp Pulse Resp BP SpO2   09/24/20 2144 99.2 °F (37.3 °C) 68 16 128/75 98 %        X-Ray, CT or other radiology findings or impressions:  XR KNEE LT 3 V    (Results Pending)          Disposition:    Diagnosis:   1. Arthritis    2.  Arthralgia of left knee        Disposition: to Home      Follow-up Information     Follow up With Specialties Details Why Contact Info    Beau Lehman MD Internal Medicine   [address] [place] [address] [address] Rossy Rose MD Orthopedic Surgery   [address] [facility]  [address]  [facility] [address]  [phone]             Patient's Medications   Start Taking    DICLOFENAC (VOLTAREN) 1 % GEL    Apply  to affected area four (4) times daily. Continue Taking    ALLOPURINOL (ZYLOPRIM) 100 MG TABLET    Take  by mouth daily. BUTALBITAL-ACETAMINOPHEN-CAFF (FIORICET) -40 MG PER CAPSULE    Take 1 Cap by mouth every four (4) hours as needed for Pain. INDOMETHACIN (INDOCIN) 50 MG CAPSULE    Take  by mouth three (3) times daily. LISINOPRIL (PRINIVIL, ZESTRIL) 5 MG TABLET    Take  by mouth daily. METFORMIN (GLUCOPHAGE) 500 MG TABLET    Take  by mouth two (2) times daily (with meals). SIMVASTATIN (ZOCOR) 10 MG TABLET    Take  by mouth nightly. ZOLPIDEM (AMBIEN) 5 MG TABLET    Take  by mouth nightly as needed for Sleep. These Medications have changed    No medications on file   Stop Taking    No medications on file       Return to the ER if you are unable to obtain referral as directed. Dotty Merino's  results have been reviewed with him. He has been counseled regarding his diagnosis, treatment, and plan. He verbally conveys understanding and agreement of the signs, symptoms, diagnosis, treatment and prognosis and additionally agrees to follow up as discussed. He also agrees with the care-plan and conveys that all of his questions have been answered. I have also provided discharge instructions for him that include: educational information regarding their diagnosis and treatment, and list of reasons why they would want to return to the ED prior to their follow-up appointment, should his condition change. Audrey Kaur ENP-C,FNP-C      Dragon voice recognition was used to generate this report, which may have resulted in some phonetic based errors in grammar and contents.  Even though attempts were made to correct all the mistakes, some may have been missed, and remained in the body of the document

## 2021-05-03 ENCOUNTER — APPOINTMENT (OUTPATIENT)
Dept: CT IMAGING | Age: 61
End: 2021-05-03
Attending: EMERGENCY MEDICINE
Payer: MEDICARE

## 2021-05-03 ENCOUNTER — HOSPITAL ENCOUNTER (EMERGENCY)
Age: 61
Discharge: HOME | End: 2021-05-03
Attending: EMERGENCY MEDICINE
Payer: MEDICARE

## 2021-05-03 VITALS
WEIGHT: 250 LBS | HEART RATE: 68 BPM | TEMPERATURE: 98.5 F | DIASTOLIC BLOOD PRESSURE: 85 MMHG | OXYGEN SATURATION: 97 % | SYSTOLIC BLOOD PRESSURE: 125 MMHG | RESPIRATION RATE: 16 BRPM | BODY MASS INDEX: 41.6 KG/M2

## 2021-05-03 DIAGNOSIS — R51.9 NONINTRACTABLE HEADACHE, UNSPECIFIED CHRONICITY PATTERN, UNSPECIFIED HEADACHE TYPE: Primary | ICD-10-CM

## 2021-05-03 PROCEDURE — 70450 CT HEAD/BRAIN W/O DYE: CPT

## 2021-05-03 PROCEDURE — 74011250637 HC RX REV CODE- 250/637: Performed by: EMERGENCY MEDICINE

## 2021-05-03 PROCEDURE — 99283 EMERGENCY DEPT VISIT LOW MDM: CPT

## 2021-05-03 RX ORDER — BUTALBITAL, ACETAMINOPHEN AND CAFFEINE 300; 40; 50 MG/1; MG/1; MG/1
1 CAPSULE ORAL
Qty: 12 CAP | Refills: 0 | Status: SHIPPED | OUTPATIENT
Start: 2021-05-03

## 2021-05-03 RX ORDER — BUTALBITAL, ACETAMINOPHEN AND CAFFEINE 300; 40; 50 MG/1; MG/1; MG/1
1 CAPSULE ORAL ONCE
Status: COMPLETED | OUTPATIENT
Start: 2021-05-03 | End: 2021-05-03

## 2021-05-03 RX ADMIN — BUTALBITAL, ACETAMINOPHEN, AND CAFFEINE CAPSULES 1 CAPSULE: 50; 300; 40 CAPSULE ORAL at 03:54

## 2021-05-03 NOTE — ED PROVIDER NOTES
[facility]  EMERGENCY DEPARTMENT HISTORY AND PHYSICAL EXAM       Date: 5/3/2021   Patient Name: Lorelei Barth   YOB: 1960  Medical Record Number: [id number]    HISTORY OF PRESENTING ILLNESS:     Lorelei Barth is a 63 y.o. male presenting with the noted PMH to the ED c/o headache. Reports intermittent pain for the past few days relieved by Tylenol. Worse tonight not relieved by Tylenol. Throbbing pain somewhat generally to the top of the left side of his head. Denies any falls or neck pain. No numbness or weakness or blurry vision or fevers or chills. History of similar in the past.    Rest of complete systems reviewed and negative    Primary Care Provider: Bonifacio Carbajal MD   Specialist:    Past Medical History:   Past Medical History:   Diagnosis Date    Diabetes [facility])         Past Surgical History:   History reviewed. No pertinent surgical history. Social History:   Social History     Tobacco Use    Smoking status: Never Smoker    Smokeless tobacco: Never Used   Substance Use Topics    Alcohol use: Yes    Drug use: Not on file        Allergies:   No Known Allergies     REVIEW OF SYSTEMS:  Review of Systems   Constitutional: Negative for fever. Neurological: Positive for headaches. Negative for dizziness. All other systems reviewed and are negative. PHYSICAL EXAM:  Vitals:    05/03/21 0224   BP: 125/85   Pulse: 68   Resp: 16   Temp: 98.5 °F (36.9 °C)   SpO2: 97%   Weight: 113.4 kg (250 lb)       Physical Exam  Vitals signs and nursing note reviewed. Constitutional:       General: He is not in acute distress. Appearance: Normal appearance. HENT:      Head: Normocephalic and atraumatic. Mouth/Throat:      Pharynx: Oropharynx is clear. Eyes:      Extraocular Movements: Extraocular movements intact. Pupils: Pupils are equal, round, and reactive to light. Neck:      Musculoskeletal: Neck supple.    Cardiovascular:      Rate and Rhythm: Normal rate. Pulses: Normal pulses. Pulmonary:      Effort: Pulmonary effort is normal.   Abdominal:      Palpations: Abdomen is soft. Musculoskeletal: Normal range of motion. Skin:     General: Skin is warm and dry. Neurological:      Mental Status: He is alert and oriented to person, place, and time. Mental status is at baseline. Cranial Nerves: No cranial nerve deficit. Motor: No weakness. Medications   butalbital-acetaminophen-caff (FIORICET) per capsule 1 Cap (1 Cap Oral Given 5/3/21 0354)       RESULTS:    Labs -   Labs Reviewed - No data to display    Radiologic Studies -  Ct Head Wo Cont    Result Date: 5/3/2021  EXAM: CT Head without Contrast CLINICAL INDICATION/HISTORY: Headache COMPARISON: September 7, 2019 TECHNIQUE:  Standard axial CT imaging of the head without contrast.  One or more dose reduction techniques were used on this CT: automated exposure control, adjustment of the mAs and/or kVp according to patient size, and iterative reconstruction techniques. The specific techniques used on this CT exam have been documented in the patient's electronic medical record. Digital Imaging and Communications in Medicine (DICOM) format image data are available to nonaffiliated external healthcare facilities or entities on a secure, media free, reciprocally searchable basis with patient authorization for at least a 12-month period after this study. FINDINGS:  Brain: Cortical sulci, basilar cisterns and ventricles appear within normal limits in size and configuration. No hemorrhage, mass effect or edema. No intra-axial or extra-axial fluid collections. Gray-white differentiation is maintained. Sinuses and mastoids: Visualized paranasal sinuses and mastoid air cells are clear. No acute intracranial findings. [facility] MEDICAL DECISION MAKING  59-year-old male here with headache. No red flags on exam or history.   Doubt acute process such as subarachnoid hemorrhage, stroke, infection, malignancy. CT scan imaging is negative for acute process. Feeling better after symptomatic management here. Will discharge home with the same. Will follow up with PCP and return to ED with worsening symptoms      Patient has no new complaints, changes, or physical findings. Results were reviewed with the patient. Pt's questions and concerns were addressed. Care plan was outlined, including follow-up with PCP/specialist and return precautions were discussed. Patient is felt to be stable for discharge at this time. Diagnosis   Clinical Impression:   1. Nonintractable headache, unspecified chronicity pattern, unspecified headache type           Follow-up Information     Follow up With Specialties Details Why Contact Info    Esvin White MD Internal Medicine Go to  for follow up [address] [facility] [address]      [facility] EMERGENCY DEPT Emergency Medicine Go to  As needed, If symptoms worsen [address] [place] [address] [address] [address]  [phone]          Discharge Medication List as of 5/3/2021  5:19 AM          Discharged in stable and improved condition. This chart was completed using Dragon, a dictation transcription service. Errors may have resulted from using this device.

## 2021-08-29 ENCOUNTER — APPOINTMENT (OUTPATIENT)
Dept: GENERAL RADIOLOGY | Age: 61
End: 2021-08-29
Attending: STUDENT IN AN ORGANIZED HEALTH CARE EDUCATION/TRAINING PROGRAM
Payer: MEDICARE

## 2021-08-29 ENCOUNTER — HOSPITAL ENCOUNTER (EMERGENCY)
Age: 61
Discharge: HOME | End: 2021-08-29
Attending: STUDENT IN AN ORGANIZED HEALTH CARE EDUCATION/TRAINING PROGRAM | Admitting: STUDENT IN AN ORGANIZED HEALTH CARE EDUCATION/TRAINING PROGRAM
Payer: MEDICARE

## 2021-08-29 VITALS
DIASTOLIC BLOOD PRESSURE: 75 MMHG | BODY MASS INDEX: 38.32 KG/M2 | TEMPERATURE: 98 F | SYSTOLIC BLOOD PRESSURE: 126 MMHG | OXYGEN SATURATION: 95 % | HEART RATE: 77 BPM | RESPIRATION RATE: 18 BRPM | HEIGHT: 65 IN | WEIGHT: 230 LBS

## 2021-08-29 DIAGNOSIS — M25.561 ACUTE PAIN OF RIGHT KNEE: Primary | ICD-10-CM

## 2021-08-29 PROCEDURE — 73562 X-RAY EXAM OF KNEE 3: CPT

## 2021-08-29 PROCEDURE — 99283 EMERGENCY DEPT VISIT LOW MDM: CPT

## 2021-08-29 PROCEDURE — 74011250637 HC RX REV CODE- 250/637: Performed by: STUDENT IN AN ORGANIZED HEALTH CARE EDUCATION/TRAINING PROGRAM

## 2021-08-29 RX ORDER — ACETAMINOPHEN 325 MG/1
500 TABLET ORAL
Qty: 20 TABLET | Refills: 0 | Status: SHIPPED | OUTPATIENT
Start: 2021-08-29

## 2021-08-29 RX ORDER — IBUPROFEN 600 MG/1
600 TABLET ORAL
Qty: 20 TABLET | Refills: 0 | Status: SHIPPED | OUTPATIENT
Start: 2021-08-29

## 2021-08-29 RX ORDER — IBUPROFEN 600 MG/1
600 TABLET ORAL ONCE
Status: COMPLETED | OUTPATIENT
Start: 2021-08-29 | End: 2021-08-29

## 2021-08-29 RX ORDER — ACETAMINOPHEN 500 MG
1000 TABLET ORAL ONCE
Status: COMPLETED | OUTPATIENT
Start: 2021-08-29 | End: 2021-08-29

## 2021-08-29 RX ADMIN — IBUPROFEN 600 MG: 600 TABLET, FILM COATED ORAL at 06:38

## 2021-08-29 RX ADMIN — ACETAMINOPHEN 1000 MG: 500 TABLET, FILM COATED ORAL at 06:38

## 2021-08-29 NOTE — ED PROVIDER NOTES
HPI   Patient is a 18-year-old male who presents with right knee pain. He states that 3 days ago at [employer] he tripped and fell onto his right knee and since that has been having pain to the upper anterior aspect of his knee. It is worse with ambulation. He has not attempted any medications for it. Denies any tingling or numbness distal to the injury. He states that it feels different from his gout and arthritis pain. He did not have any pain or discomfort prior to his fall. He has been able to ambulate but with a limp. He denies any other trauma or injury. He did not hit his head. Denies any chest pain or difficulty breathing. [place] any dizziness or lightheadedness prior to his fall. Past Medical History:   Diagnosis Date    [facility]        No past surgical history on file. Family History:   Problem Relation Age of Onset    Diabetes Sister        Social History     Socioeconomic History    Marital status: SINGLE     Spouse name: Not on file    Number of children: Not on file    Years of education: Not on file    Highest education level: Not on file   Occupational History    Not on file   Tobacco Use    Smoking status: Never Smoker    Smokeless tobacco: Never Used   Substance and Sexual Activity    Alcohol use: Yes    Drug use: Not on file    Sexual activity: Not on file   Other Topics Concern    Not on file   Social History Narrative    Not on file     Social Determinants of Health     Financial Resource Strain:     Difficulty of Paying Living Expenses:    Food Insecurity:     Worried About Running Out of Food in the Last Year:     [address] in the Last Year:    Transportation Needs:     Lack of Transportation (Medical):      Lack of Transportation (Non-Medical):    Physical Activity:     Days of Exercise per Week:     Minutes of Exercise per Session:    Stress:     Feeling of Stress :    Social Connections:     Frequency of Communication with Friends and Family:  Frequency of Social Gatherings with Friends and Family:     Attends Congregational Services:     Active Member of Clubs or Organizations:     Attends Club or Organization Meetings:     Marital Status:    Intimate Partner Violence:     Fear of Current or Ex-Partner:     Emotionally Abused:     Physically Abused:     Sexually Abused: ALLERGIES: Patient has no known allergies. Review of Systems  Constitutional: No fever  HENT: No ear pain  Eyes: No change in vision  Respiratory: No SOB  Cardio: No chest pain  GI: No blood in stool  : No hematuria  MSK: No back pain  Skin: No rashes  Neuro: No headache    Vitals:    08/29/21 0532   BP: 126/75   Pulse: 77   Resp: 18   Temp: 98 °F (36.7 °C)   SpO2: 95%   Weight: 104.3 kg (230 lb)   Height: 5' 5\" (1.651 m)            Physical Exam   General: No acute distress  Head: Normocephalic, atraumatic  Psych: Cooperative and alert  Eyes: No scleral icterus, normal conjunctiva  ENT: Moist oral mucosa  Neck: Supple  CV: Regular rate and rhythm, no pitting edema, palpable radial and DP pulses  Pulm: Clear breath sounds bilaterally without any wheezing or rhonchi, normal respiratory rate  GI: Normal bowel sounds, soft, non-tender  MSK: Moves all four extremities, mild tenderness to anterior upper aspect of right knee, can fully range of motion knee both passively and actively however does worsen pain  Skin: No rashes  Neuro: Alert and conversive    [facility]   Patient 78-year-old male who presents with right knee pain following a fall. Patient is otherwise hemodynamically stable not have any other concerning findings of trauma or injury. He is neurovascular intact distal to the injury. He does have some tenderness and is walking with a limp so we will obtain an x-ray of his knee. He does have a known history of arthritis. He also has a history of gout but has normal range of motion no significant swelling noted with the knee.   I do not suspect infection or gout at this time. Patient will be treated with Tylenol and ibuprofen. X-ray of the knee shows no acute fractures or dislocations. There is arthritis of the joint noted. We discussed him regimens to help with his knee pain at this time. Patient is able to ambulate and therefore feel that he is stable for discharge. He will be given information for orthopedics as needed. Patient stable for discharge at this time. Patient is in agreement with the plan to be discharged at this time. All the patient's questions were answered. Patient was given written instructions on the diagnosis, and states understanding of the plan moving forward. We did discuss important signs and symptoms that should prompt quick return to the emergency department. Disposition: Patient was discharged home in stable condition.   They will follow up with orthopedics    Prescriptions: Tylenol and ibuprofen    Diagnosis: Acute right knee pain  Procedures

## 2021-08-29 NOTE — Clinical Note
[address] [place] [address] Dr [facility] EMERGENCY DEPT  [address] [address] [phone]    Work/School Note    Date: 8/29/2021    To Whom It May concern:    [place] Jaramillo was seen and treated today in the emergency room by the following provider(s):  Attending Provider: Rodrigo Cruz MD.      [place] Jaramillo is excused from work/school on 8/29/2021 through 9/1/2021. He is medically clear to return to work/school on 9/2/2021.         Sincerely,          Constance Padgett MD

## 2021-08-29 NOTE — ED TRIAGE NOTES
Patient comes in complaining of right knee pain. Patient states that he went to [facility] Friday evening and he fell.

## 2021-08-29 NOTE — ED NOTES
6:46 AM  08/29/21     Discharge instructions given to patient (name) with verbalization of understanding. Patient accompanied by friend. Patient discharged with the following prescriptions Tylenol, Ibuprofen. Patient discharged to home (destination).       Aron Muir RN

## 2022-10-27 ENCOUNTER — HOSPITAL ENCOUNTER (EMERGENCY)
Age: 62
Discharge: HOME | End: 2022-10-28
Attending: EMERGENCY MEDICINE
Payer: MEDICARE

## 2022-10-27 DIAGNOSIS — R51.9 ACUTE NONINTRACTABLE HEADACHE, UNSPECIFIED HEADACHE TYPE: Primary | ICD-10-CM

## 2022-10-27 PROCEDURE — 99282 EMERGENCY DEPT VISIT SF MDM: CPT

## 2022-10-28 VITALS
DIASTOLIC BLOOD PRESSURE: 72 MMHG | SYSTOLIC BLOOD PRESSURE: 118 MMHG | OXYGEN SATURATION: 100 % | WEIGHT: 230 LBS | BODY MASS INDEX: 38.27 KG/M2 | RESPIRATION RATE: 18 BRPM | TEMPERATURE: 97.6 F | HEART RATE: 62 BPM

## 2022-10-28 NOTE — DISCHARGE INSTRUCTIONS
Telnic Activation    Thank you for requesting access to Telnic. Please follow the instructions below to securely access and download your online medical record. Telnic allows you to send messages to your doctor, view your test results, renew your prescriptions, schedule appointments, and more. How Do I Sign Up? In your internet browser, go to [email or web address]  Click on the First Time User? Click Here link in the Sign In box. You will be redirect to the New Member Sign Up page. Enter your Telnic Access Code exactly as it appears below. You will not need to use this code after youve completed the sign-up process. If you do not sign up before the expiration date, you must request a new code. Telnic Access Code: [email or web address]  Expires: 2022 11:55 PM (This is the date your Telnic access code will )    Enter the last four digits of your Social Security Number (xxxx) and Date of Birth (mm/dd/yyyy) as indicated and click Submit. You will be taken to the next sign-up page. Create a Telnic ID. This will be your Telnic login ID and cannot be changed, so think of one that is secure and easy to remember. Create a Telnic password. You can change your password at any time. Enter your Password Reset Question and Answer. This can be used at a later time if you forget your password. Enter your e-mail address. You will receive e-mail notification when new information is available in [address] Click Sign Up. You can now view and download portions of your medical record. Click the [place] link to download a portable copy of your medical information. Additional Information    If you have questions, please visit the Frequently Asked Questions section of the Telnic website at [email or web address] Remember, Telnic is NOT to be used for urgent needs. For medical emergencies, dial 911.

## 2022-10-28 NOTE — ED PROVIDER NOTES
EMERGENCY DEPARTMENT HISTORY AND PHYSICAL EXAM    Date: 10/27/2022  Patient Name: Carlitos Staton    History of Presenting Illness     Chief Complaint   Patient presents with    Headache         History Provided By: patient     Chief Complaint: headache   Duration: 2 days  Timing:  acute  Location: L side of the head  Quality: sharp aching   Severity: mild to moderate  Modifying Factors: alleviated by naproxen  Associated Symptoms: none      Additional History (Context): Carlitos Staton is a 57 y.o. male with PMH diabetes and hypertension who presents with c/o an intermittent headache to the left side of the head for the past 2 days. Patient states the pain is sharp and aching. He states he has been taking naproxen which does seem to alleviate the headache. At worst patient rates the headache 6 out of 10. He denies dizziness, vision changes, nausea, vomiting, neck pain, fever, and chills. No other complaints are reported at this time. PCP: Bee Treadwell MD    Current Outpatient Medications   Medication Sig Dispense Refill    acetaminophen (TYLENOL) 325 mg tablet Take 1.5 Tablets by mouth every four (4) hours as needed for Pain (not to exceed 3000mg per day). 20 Tablet 0    ibuprofen (MOTRIN) 600 mg tablet Take 1 Tablet by mouth every six (6) hours as needed for Pain. 20 Tablet 0    butalbital-acetaminophen-caff (Fioricet) -40 mg per capsule Take 1 Cap by mouth every four (4) hours as needed for Headache. 12 Cap 0    diclofenac (Voltaren) 1 % gel Apply  to affected area four (4) times daily. 1 Each 0    butalbital-acetaminophen-caff (FIORICET) -40 mg per capsule Take 1 Cap by mouth every four (4) hours as needed for Pain. 10 Cap 0    metFORMIN (GLUCOPHAGE) 500 mg tablet Take  by mouth two (2) times daily (with meals). simvastatin (ZOCOR) 10 mg tablet Take  by mouth nightly. allopurinol (ZYLOPRIM) 100 mg tablet Take  by mouth daily.       lisinopril (PRINIVIL, ZESTRIL) 5 mg tablet Take by mouth daily. zolpidem (AMBIEN) 5 mg tablet Take  by mouth nightly as needed for Sleep. indomethacin (INDOCIN) 50 mg capsule Take  by mouth three (3) times daily. Past History     Past Medical History:  Past Medical History:   Diagnosis Date    Diabetes [facility])        Past Surgical History:  No past surgical history on file. Family History:  Family History   Problem Relation Age of Onset    Diabetes Sister        Social History:  Social History     Tobacco Use    Smoking status: Never    Smokeless tobacco: Never   Substance Use Topics    Alcohol use: Yes       Allergies:  No Known Allergies      Review of Systems   Review of Systems   Constitutional: Negative. Negative for chills and fever. HENT: Negative. Negative for congestion, ear pain and rhinorrhea. Eyes: Negative. Negative for pain and redness. Respiratory: Negative. Negative for cough, shortness of breath, wheezing and stridor. Cardiovascular: Negative. Negative for chest pain and leg swelling. Gastrointestinal: Negative. Negative for abdominal pain, constipation, diarrhea, nausea and vomiting. Genitourinary: Negative. Negative for dysuria and frequency. Musculoskeletal: Negative. Negative for back pain and neck pain. Skin: Negative. Negative for rash and wound. Neurological:  Positive for headaches. Negative for dizziness, seizures and syncope. All other systems reviewed and are negative. All Other Systems Negative  Physical Exam     Vitals:    10/27/22 2357   BP: (!) 149/101   Pulse: 62   Resp: 18   Temp: 97.6 °F (36.4 °C)   SpO2: 100%   Weight: 104.3 kg (230 lb)     Physical Exam  Vitals and nursing note reviewed. Constitutional:       General: He is not in acute distress. Appearance: He is well-developed. He is obese. He is not ill-appearing or diaphoretic. HENT:      Head: Normocephalic and atraumatic. Eyes:      General: No scleral icterus. Right eye: No discharge.          Left eye: No discharge. Conjunctiva/sclera: Conjunctivae normal.   Cardiovascular:      Rate and Rhythm: Normal rate and regular rhythm. Heart sounds: Normal heart sounds. No murmur heard. No friction rub. No gallop. Pulmonary:      Effort: Pulmonary effort is normal. No respiratory distress. Breath sounds: Normal breath sounds. No stridor. No wheezing, rhonchi or rales. Musculoskeletal:         General: Normal range of motion. Cervical back: Normal range of motion and neck supple. Skin:     General: Skin is warm and dry. Findings: No erythema or rash. Neurological:      General: No focal deficit present. Mental Status: He is alert and oriented to person, place, and time. Mental status is at baseline. Coordination: Coordination normal.      Comments: Gait is steady and patient exhibits no evidence of ataxia. Patient is able to ambulate without difficulty. No focal neurological deficit noted. No facial droop, slurred speech, or evidence of altered mentation noted on exam.       Psychiatric:         Behavior: Behavior normal.         Thought Content: Thought content normal.            Diagnostic Study Results     Labs -   No results found for this or any previous visit (from the past 12 hour(s)). Radiologic Studies -   No orders to display     CT Results  (Last 48 hours)      None          CXR Results  (Last 48 hours)      None              Medical Decision Making   I am the first provider for this patient. I reviewed the vital signs, available nursing notes, past medical history, past surgical history, family history and social history. Vital Signs-Reviewed the patient's vital signs. Records Reviewed: Greer Kelley PA-C   Procedures:  Procedures    Provider Notes (Medical Decision Making): Impression:  headache, htn     Repeat /72    Patient is currently asymptomatic in the ED.   He states he has been experiencing an intermittent slight headache starting at the top left portion of his head and extending across his left temple. Patient states that his headache is alleviated with naproxen. He denies dizziness, nausea, vomiting, vision changes, chest pain, shortness of breath, and slurred speech. Patient states at worst he rates his pain about a 6 out of 10. Denies any prior history of stroke or aneurysm. Patient's exam is unremarkable. He is neurovascularly intact. I do not feel that the patient warrants an extensive ED work-up at this time. The patient has a primary care physician that he can follow-up with this week. Very strict return precautions advised. Patient agrees. Greer Kelley PA-C     MED RECONCILIATION:  No current facility-administered medications for this encounter. Current Outpatient Medications   Medication Sig    acetaminophen (TYLENOL) 325 mg tablet Take 1.5 Tablets by mouth every four (4) hours as needed for Pain (not to exceed 3000mg per day). ibuprofen (MOTRIN) 600 mg tablet Take 1 Tablet by mouth every six (6) hours as needed for Pain. butalbital-acetaminophen-caff (Fioricet) -40 mg per capsule Take 1 Cap by mouth every four (4) hours as needed for Headache. diclofenac (Voltaren) 1 % gel Apply  to affected area four (4) times daily. butalbital-acetaminophen-caff (FIORICET) -40 mg per capsule Take 1 Cap by mouth every four (4) hours as needed for Pain.    metFORMIN (GLUCOPHAGE) 500 mg tablet Take  by mouth two (2) times daily (with meals). simvastatin (ZOCOR) 10 mg tablet Take  by mouth nightly. allopurinol (ZYLOPRIM) 100 mg tablet Take  by mouth daily. lisinopril (PRINIVIL, ZESTRIL) 5 mg tablet Take  by mouth daily. zolpidem (AMBIEN) 5 mg tablet Take  by mouth nightly as needed for Sleep. indomethacin (INDOCIN) 50 mg capsule Take  by mouth three (3) times daily. Disposition:  D/c    DISCHARGE NOTE:   Patient is stable for discharge at this time.  I have discussed all the findings from today's work up with the patient, including lab results and imaging. I have answered all questions. No new rx given. Rest and close follow-up with the PCP recommended this week. Return to the ED immediately for any new or worsening symptoms. April Toi Rubinstein, PA-C     Follow-up Information       Follow up With Specialties Details Why Contact Info    Abhishek Del Angel MD Internal Medicine Physician In 2 days  [address] [place] [address] 61      [facility] EMERGENCY DEPT Emergency Medicine  As needed, If symptoms worsen [address] [place] [id number]  [phone]            Current Discharge Medication List              Diagnosis     Clinical Impression:   1.  Acute nonintractable headache, unspecified headache type

## 2024-06-15 ENCOUNTER — HOSPITAL ENCOUNTER (EMERGENCY)
Facility: HOSPITAL | Age: 64
Discharge: HOME | End: 2024-06-15
Payer: MEDICARE

## 2024-06-15 ENCOUNTER — APPOINTMENT (OUTPATIENT)
Facility: HOSPITAL | Age: 64
End: 2024-06-15
Payer: MEDICARE

## 2024-06-15 VITALS
OXYGEN SATURATION: 98 % | RESPIRATION RATE: 18 BRPM | SYSTOLIC BLOOD PRESSURE: 130 MMHG | HEART RATE: 81 BPM | BODY MASS INDEX: 39.27 KG/M2 | TEMPERATURE: 98.1 F | DIASTOLIC BLOOD PRESSURE: 75 MMHG | WEIGHT: 230 LBS | HEIGHT: 64 IN

## 2024-06-15 DIAGNOSIS — S29.011A MUSCLE STRAIN OF CHEST WALL, INITIAL ENCOUNTER: Primary | ICD-10-CM

## 2024-06-15 LAB
ALBUMIN SERPL-MCNC: 3.6 G/DL (ref 3.4–5)
ALBUMIN/GLOB SERPL: 0.9 (ref 0.8–1.7)
ALP SERPL-CCNC: 60 U/L (ref 45–117)
ALT SERPL-CCNC: 22 U/L (ref 16–61)
ANION GAP SERPL CALC-SCNC: 2 MMOL/L (ref 3–18)
AST SERPL-CCNC: 18 U/L (ref 10–38)
BASOPHILS # BLD: 0 K/UL (ref 0–0.1)
BASOPHILS NFR BLD: 0 % (ref 0–2)
BILIRUB SERPL-MCNC: 0.4 MG/DL (ref 0.2–1)
BUN SERPL-MCNC: 16 MG/DL (ref 7–18)
BUN/CREAT SERPL: 18 (ref 12–20)
CALCIUM SERPL-MCNC: 9.2 MG/DL (ref 8.5–10.1)
CHLORIDE SERPL-SCNC: 109 MMOL/L (ref 100–111)
CO2 SERPL-SCNC: 31 MMOL/L (ref 21–32)
CREAT SERPL-MCNC: 0.9 MG/DL (ref 0.6–1.3)
DIFFERENTIAL METHOD BLD: ABNORMAL
EKG ATRIAL RATE: 86 BPM
EKG DIAGNOSIS: NORMAL
EKG P AXIS: 61 DEGREES
EKG P-R INTERVAL: 162 MS
EKG Q-T INTERVAL: 362 MS
EKG QRS DURATION: 88 MS
EKG QTC CALCULATION (BAZETT): 433 MS
EKG R AXIS: -22 DEGREES
EKG T AXIS: -6 DEGREES
EKG VENTRICULAR RATE: 86 BPM
EOSINOPHIL # BLD: 0.1 K/UL (ref 0–0.4)
EOSINOPHIL NFR BLD: 1 % (ref 0–5)
ERYTHROCYTE [DISTWIDTH] IN BLOOD BY AUTOMATED COUNT: 15.1 % (ref 11.6–14.5)
GLOBULIN SER CALC-MCNC: 3.8 G/DL (ref 2–4)
GLUCOSE SERPL-MCNC: 112 MG/DL (ref 74–99)
HCT VFR BLD AUTO: 37.7 % (ref 36–48)
HGB BLD-MCNC: 11.9 G/DL (ref 13–16)
IMM GRANULOCYTES # BLD AUTO: 0 K/UL (ref 0–0.04)
IMM GRANULOCYTES NFR BLD AUTO: 0 % (ref 0–0.5)
LYMPHOCYTES # BLD: 2.5 K/UL (ref 0.9–3.6)
LYMPHOCYTES NFR BLD: 34 % (ref 21–52)
MAGNESIUM SERPL-MCNC: 1.8 MG/DL (ref 1.6–2.6)
MCH RBC QN AUTO: 27.5 PG (ref 24–34)
MCHC RBC AUTO-ENTMCNC: 31.6 G/DL (ref 31–37)
MCV RBC AUTO: 87.3 FL (ref 78–100)
MONOCYTES # BLD: 0.7 K/UL (ref 0.05–1.2)
MONOCYTES NFR BLD: 10 % (ref 3–10)
NEUTS SEG # BLD: 3.9 K/UL (ref 1.8–8)
NEUTS SEG NFR BLD: 54 % (ref 40–73)
NRBC # BLD: 0 K/UL (ref 0–0.01)
NRBC BLD-RTO: 0 PER 100 WBC
NT PRO BNP: 28 PG/ML (ref 0–900)
PLATELET # BLD AUTO: 201 K/UL (ref 135–420)
PMV BLD AUTO: 10 FL (ref 9.2–11.8)
POTASSIUM SERPL-SCNC: 3.8 MMOL/L (ref 3.5–5.5)
PROT SERPL-MCNC: 7.4 G/DL (ref 6.4–8.2)
RBC # BLD AUTO: 4.32 M/UL (ref 4.35–5.65)
SODIUM SERPL-SCNC: 142 MMOL/L (ref 136–145)
TROPONIN I SERPL HS-MCNC: 3 NG/L (ref 0–78)
TROPONIN I SERPL HS-MCNC: 4 NG/L (ref 0–78)
WBC # BLD AUTO: 7.3 K/UL (ref 4.6–13.2)

## 2024-06-15 PROCEDURE — 83880 ASSAY OF NATRIURETIC PEPTIDE: CPT

## 2024-06-15 PROCEDURE — 71046 X-RAY EXAM CHEST 2 VIEWS: CPT

## 2024-06-15 PROCEDURE — 83735 ASSAY OF MAGNESIUM: CPT

## 2024-06-15 PROCEDURE — 93005 ELECTROCARDIOGRAM TRACING: CPT | Performed by: EMERGENCY MEDICINE

## 2024-06-15 PROCEDURE — 85025 COMPLETE CBC W/AUTO DIFF WBC: CPT

## 2024-06-15 PROCEDURE — 80053 COMPREHEN METABOLIC PANEL: CPT

## 2024-06-15 PROCEDURE — 99285 EMERGENCY DEPT VISIT HI MDM: CPT

## 2024-06-15 PROCEDURE — 84484 ASSAY OF TROPONIN QUANT: CPT

## 2024-06-15 PROCEDURE — 93010 ELECTROCARDIOGRAM REPORT: CPT | Performed by: INTERNAL MEDICINE

## 2024-06-15 ASSESSMENT — PAIN - FUNCTIONAL ASSESSMENT: PAIN_FUNCTIONAL_ASSESSMENT: NONE - DENIES PAIN

## 2024-06-15 ASSESSMENT — ENCOUNTER SYMPTOMS
VOMITING: 0
SHORTNESS OF BREATH: 0
CHEST TIGHTNESS: 0
NAUSEA: 0
DIARRHEA: 0
CONSTIPATION: 0
ABDOMINAL PAIN: 0
COUGH: 0

## 2024-06-15 NOTE — ED PROVIDER NOTES
EMERGENCY DEPARTMENT HISTORY AND PHYSICAL EXAM    6:06 PM      Date: 6/15/2024  Patient Name: Mike Buenrostro    History of Presenting Illness     Chief Complaint   Patient presents with    chest Discomfort         History Provided By: the patient.     Additional History (Context): Mike Buenrostro is a 63 y.o. male with a history of diabetes, arthritis, and gout presenting to the emergency department due to chest discomfort.  Patient reports that he is not having any pain at all.  Reports that on the right side of his chest just feels like a soreness.  Reports that when he is just sitting there there is no pain, however if you do press on the area, it is uncomfortable.  Denies any shortness of breath.  Denies any cardiac history.  Denies fever or chills.  Denies abdominal pain, nausea, vomiting, diarrhea, constipation.    PCP: Larry Clark MD    No current facility-administered medications for this encounter.     Current Outpatient Medications   Medication Sig Dispense Refill    acetaminophen (TYLENOL) 325 MG tablet Take 487.5 mg by mouth every 4 hours as needed      allopurinol (ZYLOPRIM) 100 MG tablet Take by mouth daily      butalbital-APAP-caffeine -40 MG CAPS per capsule Take 1 capsule by mouth every 4 hours as needed      diclofenac sodium (VOLTAREN) 1 % GEL Apply topically 4 times daily      ibuprofen (ADVIL;MOTRIN) 600 MG tablet Take 600 mg by mouth every 6 hours as needed      indomethacin (INDOCIN) 50 MG capsule Take by mouth 3 times daily      lisinopril (PRINIVIL;ZESTRIL) 5 MG tablet Take by mouth daily      metFORMIN (GLUCOPHAGE) 500 MG tablet Take by mouth 2 times daily (with meals)      simvastatin (ZOCOR) 10 MG tablet Take by mouth      zolpidem (AMBIEN) 5 MG tablet Take by mouth.         Past History     Past Medical History:  Past Medical History:   Diagnosis Date    Diabetes (HCC)        Past Surgical History:  No past surgical history on file.    Family History:  Family History  [facility] [address]  [phone]    As needed, If symptoms worsen          Medication List        ASK your doctor about these medications      acetaminophen 325 MG tablet  Commonly known as: TYLENOL     allopurinol 100 MG tablet  Commonly known as: ZYLOPRIM     butalbital-APAP-caffeine -40 MG Caps per capsule     diclofenac sodium 1 % Gel  Commonly known as: VOLTAREN     ibuprofen 600 MG tablet  Commonly known as: ADVIL;MOTRIN     indomethacin 50 MG capsule  Commonly known as: INDOCIN     lisinopril 5 MG tablet  Commonly known as: PRINIVIL;ZESTRIL     metFORMIN 500 MG tablet  Commonly known as: GLUCOPHAGE     simvastatin 10 MG tablet  Commonly known as: ZOCOR     zolpidem 5 MG tablet  Commonly known as: AMBIEN               Dictation disclaimer:  Please note that this dictation was completed with Color[employer], the [employer] voice recognition software.  Quite often unanticipated grammatical, syntax, homophones, and other interpretive errors are inadvertently transcribed by the computer software.  Please disregard these errors.  Please excuse any errors that have escaped final proofreading.        Lisa Dominique PA-C  06/15/24 3833

## 2024-06-15 NOTE — ED TRIAGE NOTES
Patient presented to the Emergency Dept with a complaint of \"chest not feeling right\" patient denies having pain or discomfort to area, patient is unable to describe chest discomfort. symptoms started on Wednesday.    Patient states that he was lying down when the discomfort appears.    Patient states that he feels discomfort more when applied pressure to area    Patient alert and oriented x 4, patient breathes freely on room air in nil cardiopulmonary distress

## 2024-08-28 ENCOUNTER — HOSPITAL ENCOUNTER (EMERGENCY)
Facility: HOSPITAL | Age: 64
Discharge: HOME | End: 2024-08-28
Payer: MEDICARE

## 2024-08-28 VITALS
HEIGHT: 64 IN | HEART RATE: 78 BPM | DIASTOLIC BLOOD PRESSURE: 82 MMHG | WEIGHT: 230 LBS | SYSTOLIC BLOOD PRESSURE: 137 MMHG | RESPIRATION RATE: 18 BRPM | OXYGEN SATURATION: 99 % | BODY MASS INDEX: 39.27 KG/M2 | TEMPERATURE: 97.4 F

## 2024-08-28 DIAGNOSIS — K02.9 DENTAL DECAY: Primary | ICD-10-CM

## 2024-08-28 PROCEDURE — 99283 EMERGENCY DEPT VISIT LOW MDM: CPT

## 2024-08-28 RX ORDER — CLINDAMYCIN HCL 300 MG
300 CAPSULE ORAL 3 TIMES DAILY
Qty: 21 CAPSULE | Refills: 0 | Status: SHIPPED | OUTPATIENT
Start: 2024-08-28 | End: 2024-09-04

## 2024-08-28 ASSESSMENT — ENCOUNTER SYMPTOMS
COUGH: 0
SORE THROAT: 0
SHORTNESS OF BREATH: 0
ABDOMINAL PAIN: 0
DIARRHEA: 0
NAUSEA: 0
EYE DISCHARGE: 0

## 2024-08-28 ASSESSMENT — PAIN - FUNCTIONAL ASSESSMENT: PAIN_FUNCTIONAL_ASSESSMENT: NONE - DENIES PAIN

## 2024-08-28 NOTE — ED PROVIDER NOTES
EMERGENCY DEPARTMENT HISTORY AND PHYSICAL EXAM    Date: 8/28/2024  Patient Name: Mike Buenrostro    History of Presenting Illness     Chief Complaint   Patient presents with    Dental Pain         History Provided By: Patient       Additional History (Context): Mike Buenrostro is a 63 y.o. male with a history of poor dentition who presents today for right lower dental concern.  Patient reports he feels like his tooth is about to fall out.  States that he has an appointment with a dentist coming up however came today for sooner evaluation.  Patient denies any real pain, fevers or chills.  Denies any facial swelling.  Has no other associated complaints or concerns at this time      PCP: Larry Clark MD    No current facility-administered medications for this encounter.     Current Outpatient Medications   Medication Sig Dispense Refill    clindamycin (CLEOCIN) 300 MG capsule Take 1 capsule by mouth 3 times daily for 7 days 21 capsule 0    acetaminophen (TYLENOL) 325 MG tablet Take 487.5 mg by mouth every 4 hours as needed      allopurinol (ZYLOPRIM) 100 MG tablet Take by mouth daily      butalbital-APAP-caffeine -40 MG CAPS per capsule Take 1 capsule by mouth every 4 hours as needed      diclofenac sodium (VOLTAREN) 1 % GEL Apply topically 4 times daily      ibuprofen (ADVIL;MOTRIN) 600 MG tablet Take 600 mg by mouth every 6 hours as needed      indomethacin (INDOCIN) 50 MG capsule Take by mouth 3 times daily      lisinopril (PRINIVIL;ZESTRIL) 5 MG tablet Take by mouth daily      metFORMIN (GLUCOPHAGE) 500 MG tablet Take by mouth 2 times daily (with meals)      simvastatin (ZOCOR) 10 MG tablet Take by mouth      zolpidem (AMBIEN) 5 MG tablet Take by mouth.         Past History     Past Medical History:  Past Medical History:   Diagnosis Date    Diabetes (HCC)        Past Surgical History:  No past surgical history on file.    Family History:  Family History   Problem Relation Age of Onset    Diabetes  interpretive errors are inadvertently transcribed by the computer software. Please disregard these errors. Please excuse any errors that have escaped final proofreading.\"         June Vazquez, PA  08/28/24 0947

## 2024-08-28 NOTE — ED TRIAGE NOTES
Patient states that tooth on the bottom right side of mouth is loose. Denies pain. States can't get a dentist appointment until 9/20.

## 2025-03-15 ENCOUNTER — HOSPITAL ENCOUNTER (EMERGENCY)
Facility: HOSPITAL | Age: 65
Discharge: HOME | End: 2025-03-15
Payer: MEDICARE

## 2025-03-15 VITALS
RESPIRATION RATE: 18 BRPM | SYSTOLIC BLOOD PRESSURE: 105 MMHG | WEIGHT: 240 LBS | HEIGHT: 64 IN | TEMPERATURE: 98.2 F | OXYGEN SATURATION: 97 % | DIASTOLIC BLOOD PRESSURE: 64 MMHG | BODY MASS INDEX: 40.97 KG/M2 | HEART RATE: 95 BPM

## 2025-03-15 DIAGNOSIS — T16.2XXA FOREIGN BODY OF LEFT EAR, INITIAL ENCOUNTER: Primary | ICD-10-CM

## 2025-03-15 DIAGNOSIS — H60.392 INFECTIVE OTITIS EXTERNA OF LEFT EAR: ICD-10-CM

## 2025-03-15 PROCEDURE — 6370000000 HC RX 637 (ALT 250 FOR IP): Performed by: HEALTH CARE PROVIDER

## 2025-03-15 PROCEDURE — 69209 REMOVE IMPACTED EAR WAX UNI: CPT

## 2025-03-15 PROCEDURE — 99283 EMERGENCY DEPT VISIT LOW MDM: CPT

## 2025-03-15 RX ORDER — CIPROFLOXACIN AND DEXAMETHASONE 3; 1 MG/ML; MG/ML
4 SUSPENSION/ DROPS AURICULAR (OTIC) 2 TIMES DAILY
Qty: 7.5 ML | Refills: 0 | Status: SHIPPED | OUTPATIENT
Start: 2025-03-15 | End: 2025-03-20

## 2025-03-15 RX ADMIN — CARBAMIDE PEROXIDE 6.5% 5 DROP: 6.5 LIQUID AURICULAR (OTIC) at 14:02

## 2025-03-15 ASSESSMENT — PAIN SCALES - GENERAL: PAINLEVEL_OUTOF10: 3

## 2025-03-15 ASSESSMENT — ENCOUNTER SYMPTOMS
ABDOMINAL PAIN: 0
DIARRHEA: 0
VOMITING: 0
NAUSEA: 0
COUGH: 0
CHEST TIGHTNESS: 0
SHORTNESS OF BREATH: 0

## 2025-03-15 ASSESSMENT — PAIN DESCRIPTION - LOCATION: LOCATION: EAR

## 2025-03-15 ASSESSMENT — LIFESTYLE VARIABLES
HOW MANY STANDARD DRINKS CONTAINING ALCOHOL DO YOU HAVE ON A TYPICAL DAY: PATIENT DOES NOT DRINK
HOW OFTEN DO YOU HAVE A DRINK CONTAINING ALCOHOL: NEVER

## 2025-03-15 ASSESSMENT — PAIN - FUNCTIONAL ASSESSMENT: PAIN_FUNCTIONAL_ASSESSMENT: 0-10

## 2025-03-15 ASSESSMENT — PAIN DESCRIPTION - ORIENTATION: ORIENTATION: LEFT

## 2025-03-15 NOTE — ED PROVIDER NOTES
EMERGENCY DEPARTMENT HISTORY AND PHYSICAL EXAM        Date: 3/15/2025  Patient Name: Mike Buenrostro    History of Presenting Illness     Chief Complaint   Patient presents with    Ear Pain       History Provided By: History obtained from patient    HPI: Mike Buenrostro, 64 y.o. male presents to the ED with cc of left ear fullness x 3 days    Patient reports to the ED with feeling like his left ear is clogged.  Said that he does not have a history of wax impaction.  He tried an over-the-counter softener and flush product at home without relief.  He denies ear pain denies decreased hearing.  Denies dizziness or headache.  He would like to have the ear flushed in the emergency department.      No nausea, vomiting, diarrhea, fever, chills, chest pain, shortness of breath, leg swelling    There are no other complaints, changes, or physical findings at this time.    Records Reviewed: [facility] ED visit 8/28/24 dental pain    PCP: Larry Clark MD    No current facility-administered medications on file prior to encounter.     Current Outpatient Medications on File Prior to Encounter   Medication Sig Dispense Refill    acetaminophen (TYLENOL) 325 MG tablet Take 487.5 mg by mouth every 4 hours as needed      allopurinol (ZYLOPRIM) 100 MG tablet Take by mouth daily      butalbital-APAP-caffeine -40 MG CAPS per capsule Take 1 capsule by mouth every 4 hours as needed      diclofenac sodium (VOLTAREN) 1 % GEL Apply topically 4 times daily      ibuprofen (ADVIL;MOTRIN) 600 MG tablet Take 600 mg by mouth every 6 hours as needed      indomethacin (INDOCIN) 50 MG capsule Take by mouth 3 times daily      lisinopril (PRINIVIL;ZESTRIL) 5 MG tablet Take by mouth daily      metFORMIN (GLUCOPHAGE) 500 MG tablet Take by mouth 2 times daily (with meals)      simvastatin (ZOCOR) 10 MG tablet Take by mouth      zolpidem (AMBIEN) 5 MG tablet Take by mouth.         Past History     Past Medical History:  Past Medical History:   Diagnosis  1829